# Patient Record
Sex: MALE | Race: WHITE | Employment: OTHER | URBAN - NONMETROPOLITAN AREA
[De-identification: names, ages, dates, MRNs, and addresses within clinical notes are randomized per-mention and may not be internally consistent; named-entity substitution may affect disease eponyms.]

---

## 2021-01-01 ENCOUNTER — HOSPITAL ENCOUNTER (OUTPATIENT)
Age: 71
Discharge: HOME | End: 2021-09-05
Payer: MEDICARE

## 2021-01-01 ENCOUNTER — TELEPHONE (OUTPATIENT)
Dept: PAIN MANAGEMENT | Age: 71
End: 2021-01-01

## 2021-01-01 ENCOUNTER — HOSPITAL ENCOUNTER (OUTPATIENT)
Dept: CT IMAGING | Age: 71
Discharge: HOME | End: 2021-10-29
Payer: MEDICARE

## 2021-01-01 ENCOUNTER — HOSPITAL ENCOUNTER (OUTPATIENT)
Age: 71
Setting detail: SPECIMEN
Discharge: HOME | End: 2021-06-16
Payer: MEDICARE

## 2021-01-01 ENCOUNTER — HOSPITAL ENCOUNTER (OUTPATIENT)
Dept: GENERAL RADIOLOGY | Age: 71
Discharge: HOME | End: 2021-09-05
Payer: MEDICARE

## 2021-01-01 ENCOUNTER — HOSPITAL ENCOUNTER (OUTPATIENT)
Dept: MRI IMAGING | Age: 71
End: 2021-01-01
Payer: MEDICARE

## 2021-01-01 ENCOUNTER — HOSPITAL ENCOUNTER (OUTPATIENT)
Age: 71
Setting detail: SPECIMEN
Discharge: HOME | End: 2021-07-16
Payer: MEDICARE

## 2021-01-01 ENCOUNTER — HOSPITAL ENCOUNTER (OUTPATIENT)
Dept: MRI IMAGING | Age: 71
Discharge: HOME | End: 2021-09-05
Payer: MEDICARE

## 2021-01-01 DIAGNOSIS — M54.12 CERVICAL NEURITIS: ICD-10-CM

## 2021-01-01 DIAGNOSIS — M25.512 BILATERAL SHOULDER PAIN, UNSPECIFIED CHRONICITY: ICD-10-CM

## 2021-01-01 DIAGNOSIS — M25.511 BILATERAL SHOULDER PAIN, UNSPECIFIED CHRONICITY: ICD-10-CM

## 2021-01-01 DIAGNOSIS — M54.16 LUMBAR RADICULOPATHY: ICD-10-CM

## 2021-01-01 DIAGNOSIS — M54.12 CERVICAL RADICULITIS: ICD-10-CM

## 2021-01-01 DIAGNOSIS — M54.16 LUMBAR NEURITIS: ICD-10-CM

## 2021-01-01 LAB
ABSOLUTE EOS #: 0.07 K/UL (ref 0–0.44)
ABSOLUTE IMMATURE GRANULOCYTE: <0.03 K/UL (ref 0–0.3)
ABSOLUTE LYMPH #: 1.26 K/UL (ref 1.1–3.7)
ABSOLUTE MONO #: 0.31 K/UL (ref 0.1–1.2)
ALBUMIN SERPL-MCNC: 3.8 G/DL (ref 3.5–5.2)
ALBUMIN/GLOBULIN RATIO: 1.4 (ref 1–2.5)
ALP BLD-CCNC: 106 U/L (ref 40–129)
ALT SERPL-CCNC: 10 U/L (ref 5–41)
ANION GAP SERPL CALCULATED.3IONS-SCNC: 13 MMOL/L (ref 9–17)
AST SERPL-CCNC: 20 U/L
BASOPHILS # BLD: 0 % (ref 0–2)
BASOPHILS ABSOLUTE: <0.03 K/UL (ref 0–0.2)
BILIRUB SERPL-MCNC: 0.41 MG/DL (ref 0.3–1.2)
BUN BLDV-MCNC: 12 MG/DL (ref 8–23)
BUN/CREAT BLD: ABNORMAL (ref 9–20)
CALCIUM SERPL-MCNC: 8.7 MG/DL (ref 8.6–10.4)
CHLORIDE BLD-SCNC: 101 MMOL/L (ref 98–107)
CO2: 24 MMOL/L (ref 20–31)
CREAT SERPL-MCNC: 0.44 MG/DL (ref 0.7–1.2)
DIFFERENTIAL TYPE: ABNORMAL
EOSINOPHILS RELATIVE PERCENT: 2 % (ref 1–4)
GFR AFRICAN AMERICAN: >60 ML/MIN
GFR NON-AFRICAN AMERICAN: >60 ML/MIN
GFR SERPL CREATININE-BSD FRML MDRD: ABNORMAL ML/MIN/{1.73_M2}
GFR SERPL CREATININE-BSD FRML MDRD: ABNORMAL ML/MIN/{1.73_M2}
GLUCOSE BLD-MCNC: 124 MG/DL (ref 70–99)
HCT VFR BLD CALC: 39.4 % (ref 40.7–50.3)
HEMOGLOBIN: 12.7 G/DL (ref 13–17)
IMMATURE GRANULOCYTES: 0 %
LYMPHOCYTES # BLD: 30 % (ref 24–43)
MAGNESIUM: 1.8 MG/DL (ref 1.6–2.6)
MCH RBC QN AUTO: 25.7 PG (ref 25.2–33.5)
MCHC RBC AUTO-ENTMCNC: 32.2 G/DL (ref 28.4–34.8)
MCV RBC AUTO: 79.6 FL (ref 82.6–102.9)
MONOCYTES # BLD: 7 % (ref 3–12)
NRBC AUTOMATED: 0 PER 100 WBC
PDW BLD-RTO: 13.9 % (ref 11.8–14.4)
PLATELET # BLD: 194 K/UL (ref 138–453)
PLATELET ESTIMATE: ABNORMAL
PMV BLD AUTO: 11.4 FL (ref 8.1–13.5)
POTASSIUM SERPL-SCNC: 3.4 MMOL/L (ref 3.7–5.3)
POTASSIUM SERPL-SCNC: 4.4 MMOL/L (ref 3.7–5.3)
RBC # BLD: 4.95 M/UL (ref 4.21–5.77)
RBC # BLD: ABNORMAL 10*6/UL
SEG NEUTROPHILS: 61 % (ref 36–65)
SEGMENTED NEUTROPHILS ABSOLUTE COUNT: 2.57 K/UL (ref 1.5–8.1)
SODIUM BLD-SCNC: 138 MMOL/L (ref 135–144)
THYROXINE, FREE: 3.4 NG/DL (ref 0.93–1.7)
TOTAL PROTEIN: 6.5 G/DL (ref 6.4–8.3)
TSH SERPL DL<=0.05 MIU/L-ACNC: <0.01 MIU/L (ref 0.3–5)
WBC # BLD: 4.2 K/UL (ref 3.5–11.3)
WBC # BLD: ABNORMAL 10*3/UL

## 2021-01-01 PROCEDURE — 72131 CT LUMBAR SPINE W/O DYE: CPT

## 2021-01-01 PROCEDURE — 73030 X-RAY EXAM OF SHOULDER: CPT

## 2021-01-01 PROCEDURE — 72050 X-RAY EXAM NECK SPINE 4/5VWS: CPT

## 2021-01-01 PROCEDURE — 72110 X-RAY EXAM L-2 SPINE 4/>VWS: CPT

## 2021-01-01 PROCEDURE — 72125 CT NECK SPINE W/O DYE: CPT

## 2021-06-08 NOTE — TELEPHONE ENCOUNTER
Initial Intake for Pain Management Clinic:  Kaushik Jason (48 y.o., male)    : 1950     Which Provider sent Referral Kylee Echavarria   What is your address (include city,state) [address]   Purpose for Referral    Will all medication management and therapy for pain be managed by Dr. Rehana Roldan?        Chief Complaint Lumbago with sciatica When did your pain start?    What is your pain level today?  (0-10)     8   How did you find out about us or who sent you? Kylee Echavarria   What do you feel caused your pain? Injured self at work years ago What words would you use to describe your pain? throbbing   What makes your pain better? Rest and medications What makes your pain worse?   moving   Have you had any MRI's or Xrays? If yes and they were from somewhere other than a [facility] ([facility]) facility - please bring copies of reports with you Yes moved here from [place], will bring Have you had any other tests done? **If yes and they were from somewhere other than a [facility] ([facility] facility - please bring copies of reports with you Over the years yes   Have you had any surgeries specific to your pain?   no Have you had any shots/injections for your pain? Back in the    Have you tried any therapies for your pain? If yes, what?  (I.e. PT, OT, Massage, Aqua, Chiropractic, TENS)   Back in the  What doctors have you seen for your pain and what did they do for you? Jessica echavarria referred to Dr Rehana Roldan   What medications have you tried? oxycodone Do you have any medication allergies? no   Do you drink alcohol? no Do you smoke? no   Any history of substance abuse?   no Are you employed? retired   What do you want to this treatment to do for you?  (I.E. Increase activity, return to work, decrease pain) Decrease the pan Please remind patient to bring ALL medications into clinic appointment.

## 2022-01-01 ENCOUNTER — APPOINTMENT (OUTPATIENT)
Dept: ULTRASOUND IMAGING | Age: 72
DRG: 871 | End: 2022-01-01
Payer: MEDICARE

## 2022-01-01 ENCOUNTER — APPOINTMENT (OUTPATIENT)
Dept: GENERAL RADIOLOGY | Age: 72
DRG: 871 | End: 2022-01-01
Payer: MEDICARE

## 2022-01-01 ENCOUNTER — APPOINTMENT (OUTPATIENT)
Dept: CT IMAGING | Age: 72
DRG: 871 | End: 2022-01-01
Payer: MEDICARE

## 2022-01-01 ENCOUNTER — HOSPITAL ENCOUNTER (OUTPATIENT)
Dept: NON INVASIVE DIAGNOSTICS | Age: 72
Discharge: HOME | DRG: 871 | End: 2022-04-15
Payer: MEDICARE

## 2022-01-01 ENCOUNTER — HOSPITAL ENCOUNTER (INPATIENT)
Age: 72
LOS: 1 days | DRG: 871 | End: 2022-04-16
Attending: EMERGENCY MEDICINE | Admitting: STUDENT IN AN ORGANIZED HEALTH CARE EDUCATION/TRAINING PROGRAM
Payer: MEDICARE

## 2022-01-01 VITALS
DIASTOLIC BLOOD PRESSURE: 56 MMHG | TEMPERATURE: 98.9 F | RESPIRATION RATE: 30 BRPM | SYSTOLIC BLOOD PRESSURE: 87 MMHG | OXYGEN SATURATION: 96 % | BODY MASS INDEX: 16.68 KG/M2 | HEART RATE: 162 BPM | HEIGHT: 74 IN | WEIGHT: 130 LBS

## 2022-01-01 DIAGNOSIS — E87.5 HYPERKALEMIA: ICD-10-CM

## 2022-01-01 DIAGNOSIS — N17.9 AKI (ACUTE KIDNEY INJURY) (HCC): ICD-10-CM

## 2022-01-01 DIAGNOSIS — R00.0 TACHYCARDIA: ICD-10-CM

## 2022-01-01 DIAGNOSIS — A41.9 SEPTICEMIA (HCC): Primary | ICD-10-CM

## 2022-01-01 LAB
-: ABNORMAL
ABSOLUTE BANDS #: 1.02 K/UL (ref 0–1)
ABSOLUTE EOS #: 0 K/UL (ref 0–0.44)
ABSOLUTE IMMATURE GRANULOCYTE: 0.26 K/UL (ref 0–0.3)
ABSOLUTE LYMPH #: 3.84 K/UL (ref 1.1–3.7)
ABSOLUTE MONO #: 0.77 K/UL (ref 0.1–1.2)
ANION GAP SERPL CALCULATED.3IONS-SCNC: 21 MMOL/L (ref 9–17)
BACTERIA: ABNORMAL
BANDS: 4 % (ref 0–10)
BASOPHILS # BLD: 0 % (ref 0–2)
BASOPHILS ABSOLUTE: 0 K/UL (ref 0–0.2)
BILIRUBIN URINE: NEGATIVE
BUN BLDV-MCNC: 100 MG/DL (ref 8–23)
BUN/CREAT BLD: 71 (ref 9–20)
CALCIUM SERPL-MCNC: 9.6 MG/DL (ref 8.6–10.4)
CASTS UA: ABNORMAL /LPF
CHLORIDE BLD-SCNC: 111 MMOL/L (ref 98–107)
CO2: 20 MMOL/L (ref 20–31)
COLOR: YELLOW
CREAT SERPL-MCNC: 1.4 MG/DL (ref 0.7–1.2)
CRYSTALS, UA: ABNORMAL /HPF
EKG ATRIAL RATE: 178 BPM
EKG ATRIAL RATE: 192 BPM
EKG Q-T INTERVAL: 214 MS
EKG Q-T INTERVAL: 256 MS
EKG QRS DURATION: 104 MS
EKG QRS DURATION: 108 MS
EKG QTC CALCULATION (BAZETT): 375 MS
EKG QTC CALCULATION (BAZETT): 398 MS
EKG R AXIS: 82 DEGREES
EKG R AXIS: 86 DEGREES
EKG T AXIS: -109 DEGREES
EKG T AXIS: -86 DEGREES
EKG VENTRICULAR RATE: 146 BPM
EKG VENTRICULAR RATE: 185 BPM
EOSINOPHILS RELATIVE PERCENT: 0 % (ref 1–4)
EPITHELIAL CELLS UA: ABNORMAL /HPF (ref 0–5)
GFR AFRICAN AMERICAN: >60 ML/MIN
GFR NON-AFRICAN AMERICAN: 50 ML/MIN
GFR SERPL CREATININE-BSD FRML MDRD: ABNORMAL ML/MIN/{1.73_M2}
GFR SERPL CREATININE-BSD FRML MDRD: ABNORMAL ML/MIN/{1.73_M2}
GLUCOSE BLD-MCNC: 294 MG/DL (ref 70–99)
GLUCOSE URINE: NEGATIVE
HCO3 ARTERIAL: 21.5 MMOL/L (ref 22–26)
HCT VFR BLD CALC: 33.8 % (ref 40.7–50.3)
HCT VFR BLD CALC: 39.2 % (ref 40.7–50.3)
HEMOGLOBIN: 10.1 G/DL (ref 13–17)
HEMOGLOBIN: 11.6 G/DL (ref 13–17)
IMMATURE GRANULOCYTES: 1 %
INR BLD: 1.7
KETONES, URINE: NEGATIVE
LACTIC ACID, SEPSIS: 4.6 MMOL/L (ref 0.5–1.9)
LACTIC ACID: 5.7 MMOL/L (ref 0.5–2.2)
LACTIC ACID: 9 MMOL/L (ref 0.5–2.2)
LEUKOCYTE ESTERASE, URINE: NEGATIVE
LV EF: 23 %
LVEF MODALITY: NORMAL
LYMPHOCYTES # BLD: 15 % (ref 24–43)
MCH RBC QN AUTO: 24.5 PG (ref 25.2–33.5)
MCH RBC QN AUTO: 24.5 PG (ref 25.2–33.5)
MCHC RBC AUTO-ENTMCNC: 29.6 G/DL (ref 28.4–34.8)
MCHC RBC AUTO-ENTMCNC: 29.9 G/DL (ref 28.4–34.8)
MCV RBC AUTO: 81.8 FL (ref 82.6–102.9)
MCV RBC AUTO: 82.9 FL (ref 82.6–102.9)
MONOCYTES # BLD: 3 % (ref 3–12)
MORPHOLOGY: ABNORMAL
MORPHOLOGY: ABNORMAL
MUCUS: ABNORMAL
NEGATIVE BASE EXCESS, ART: 1.1 MMOL/L (ref 0–2)
NITRITE, URINE: NEGATIVE
NRBC AUTOMATED: 0.1 PER 100 WBC
NRBC AUTOMATED: 0.1 PER 100 WBC
O2 DEVICE/FLOW/%: ABNORMAL
O2 SAT, ARTERIAL: 97.5 % (ref 95–98)
PARTIAL THROMBOPLASTIN TIME: 25.9 SEC (ref 26.8–34.8)
PATIENT TEMP: 37
PCO2 ARTERIAL: 30.3 MMHG (ref 35–45)
PDW BLD-RTO: 14.9 % (ref 11.8–14.4)
PDW BLD-RTO: 15.1 % (ref 11.8–14.4)
PH ARTERIAL: 7.47 (ref 7.35–7.45)
PH UA: 5.5 (ref 5–9)
PLATELET # BLD: 130 K/UL (ref 138–453)
PLATELET # BLD: 205 K/UL (ref 138–453)
PMV BLD AUTO: 11.2 FL (ref 8.1–13.5)
PMV BLD AUTO: 11.3 FL (ref 8.1–13.5)
PO2 ARTERIAL: 91.4 MMHG (ref 80–100)
POTASSIUM SERPL-SCNC: 5.4 MMOL/L (ref 3.7–5.3)
PROCALCITONIN: 0.4 NG/ML
PROTEIN UA: ABNORMAL
PROTHROMBIN TIME: 19.9 SEC (ref 11.5–14.2)
PT. POSITION: ABNORMAL
RBC # BLD: 4.13 M/UL (ref 4.21–5.77)
RBC # BLD: 4.73 M/UL (ref 4.21–5.77)
RBC UA: ABNORMAL /HPF (ref 0–2)
RESPIRATORY RATE: 26
SAMPLE SITE: ABNORMAL
SEG NEUTROPHILS: 77 % (ref 36–65)
SEGMENTED NEUTROPHILS ABSOLUTE COUNT: 19.71 K/UL (ref 1.5–8.1)
SODIUM BLD-SCNC: 152 MMOL/L (ref 135–144)
SPECIFIC GRAVITY UA: 1.02 (ref 1.01–1.02)
TOTAL CK: 132 U/L (ref 39–308)
TROPONIN, HIGH SENSITIVITY: 20 NG/L (ref 0–22)
TROPONIN, HIGH SENSITIVITY: 22 NG/L (ref 0–22)
TROPONIN, HIGH SENSITIVITY: 25 NG/L (ref 0–22)
TURBIDITY: CLEAR
URINE HGB: NEGATIVE
UROBILINOGEN, URINE: NORMAL
WBC # BLD: 18.6 K/UL (ref 3.5–11.3)
WBC # BLD: 25.6 K/UL (ref 3.5–11.3)
WBC UA: ABNORMAL /HPF (ref 0–5)

## 2022-01-01 PROCEDURE — 92960 CARDIOVERSION ELECTRIC EXT: CPT

## 2022-01-01 PROCEDURE — 73060 X-RAY EXAM OF HUMERUS: CPT

## 2022-01-01 PROCEDURE — 2580000003 HC RX 258: Performed by: NURSE PRACTITIONER

## 2022-01-01 PROCEDURE — 85027 COMPLETE CBC AUTOMATED: CPT

## 2022-01-01 PROCEDURE — 96376 TX/PRO/DX INJ SAME DRUG ADON: CPT

## 2022-01-01 PROCEDURE — 93010 ELECTROCARDIOGRAM REPORT: CPT | Performed by: INTERNAL MEDICINE

## 2022-01-01 PROCEDURE — 96375 TX/PRO/DX INJ NEW DRUG ADDON: CPT

## 2022-01-01 PROCEDURE — 6360000002 HC RX W HCPCS: Performed by: NURSE PRACTITIONER

## 2022-01-01 PROCEDURE — 6360000002 HC RX W HCPCS: Performed by: STUDENT IN AN ORGANIZED HEALTH CARE EDUCATION/TRAINING PROGRAM

## 2022-01-01 PROCEDURE — 6360000002 HC RX W HCPCS

## 2022-01-01 PROCEDURE — 71045 X-RAY EXAM CHEST 1 VIEW: CPT

## 2022-01-01 PROCEDURE — 93005 ELECTROCARDIOGRAM TRACING: CPT | Performed by: EMERGENCY MEDICINE

## 2022-01-01 PROCEDURE — 94761 N-INVAS EAR/PLS OXIMETRY MLT: CPT

## 2022-01-01 PROCEDURE — 2580000003 HC RX 258: Performed by: EMERGENCY MEDICINE

## 2022-01-01 PROCEDURE — 2500000003 HC RX 250 WO HCPCS

## 2022-01-01 PROCEDURE — 02HV33Z INSERTION OF INFUSION DEVICE INTO SUPERIOR VENA CAVA, PERCUTANEOUS APPROACH: ICD-10-PCS | Performed by: STUDENT IN AN ORGANIZED HEALTH CARE EDUCATION/TRAINING PROGRAM

## 2022-01-01 PROCEDURE — 70450 CT HEAD/BRAIN W/O DYE: CPT

## 2022-01-01 PROCEDURE — 6370000000 HC RX 637 (ALT 250 FOR IP): Performed by: NURSE PRACTITIONER

## 2022-01-01 PROCEDURE — 82550 ASSAY OF CK (CPK): CPT

## 2022-01-01 PROCEDURE — 2500000003 HC RX 250 WO HCPCS: Performed by: NURSE PRACTITIONER

## 2022-01-01 PROCEDURE — 82805 BLOOD GASES W/O2 SATURATION: CPT

## 2022-01-01 PROCEDURE — A4216 STERILE WATER/SALINE, 10 ML: HCPCS | Performed by: NURSE PRACTITIONER

## 2022-01-01 PROCEDURE — 99285 EMERGENCY DEPT VISIT HI MDM: CPT

## 2022-01-01 PROCEDURE — 84484 ASSAY OF TROPONIN QUANT: CPT

## 2022-01-01 PROCEDURE — 2500000003 HC RX 250 WO HCPCS: Performed by: EMERGENCY MEDICINE

## 2022-01-01 PROCEDURE — 85730 THROMBOPLASTIN TIME PARTIAL: CPT

## 2022-01-01 PROCEDURE — 73080 X-RAY EXAM OF ELBOW: CPT

## 2022-01-01 PROCEDURE — 85025 COMPLETE CBC W/AUTO DIFF WBC: CPT

## 2022-01-01 PROCEDURE — 85610 PROTHROMBIN TIME: CPT

## 2022-01-01 PROCEDURE — 1200000000 HC SEMI PRIVATE

## 2022-01-01 PROCEDURE — 36415 COLL VENOUS BLD VENIPUNCTURE: CPT

## 2022-01-01 PROCEDURE — 71250 CT THORAX DX C-: CPT

## 2022-01-01 PROCEDURE — 96374 THER/PROPH/DIAG INJ IV PUSH: CPT

## 2022-01-01 PROCEDURE — 83605 ASSAY OF LACTIC ACID: CPT

## 2022-01-01 PROCEDURE — 87040 BLOOD CULTURE FOR BACTERIA: CPT

## 2022-01-01 PROCEDURE — 6360000002 HC RX W HCPCS: Performed by: EMERGENCY MEDICINE

## 2022-01-01 PROCEDURE — 36600 WITHDRAWAL OF ARTERIAL BLOOD: CPT

## 2022-01-01 PROCEDURE — 99223 1ST HOSP IP/OBS HIGH 75: CPT | Performed by: INTERNAL MEDICINE

## 2022-01-01 PROCEDURE — 93306 TTE W/DOPPLER COMPLETE: CPT

## 2022-01-01 PROCEDURE — 81001 URINALYSIS AUTO W/SCOPE: CPT

## 2022-01-01 PROCEDURE — 84145 PROCALCITONIN (PCT): CPT

## 2022-01-01 PROCEDURE — 36569 INSJ PICC 5 YR+ W/O IMAGING: CPT

## 2022-01-01 PROCEDURE — 2700000000 HC OXYGEN THERAPY PER DAY

## 2022-01-01 PROCEDURE — 72125 CT NECK SPINE W/O DYE: CPT

## 2022-01-01 PROCEDURE — 80048 BASIC METABOLIC PNL TOTAL CA: CPT

## 2022-01-01 RX ORDER — DEXTROSE MONOHYDRATE 50 MG/ML
INJECTION, SOLUTION INTRAVENOUS
Status: DISCONTINUED
Start: 2022-01-01 | End: 2022-01-01

## 2022-01-01 RX ORDER — SODIUM CHLORIDE 9 MG/ML
INJECTION, SOLUTION INTRAVENOUS CONTINUOUS
Status: CANCELLED | OUTPATIENT
Start: 2022-01-01

## 2022-01-01 RX ORDER — 0.9 % SODIUM CHLORIDE 0.9 %
1000 INTRAVENOUS SOLUTION INTRAVENOUS ONCE
Status: COMPLETED | OUTPATIENT
Start: 2022-01-01 | End: 2022-01-01

## 2022-01-01 RX ORDER — MORPHINE SULFATE 2 MG/ML
2 INJECTION, SOLUTION INTRAMUSCULAR; INTRAVENOUS
Status: DISCONTINUED | OUTPATIENT
Start: 2022-01-01 | End: 2022-01-01 | Stop reason: HOSPADM

## 2022-01-01 RX ORDER — SODIUM CHLORIDE 0.9 % (FLUSH) 0.9 %
5-40 SYRINGE (ML) INJECTION PRN
Status: DISCONTINUED | OUTPATIENT
Start: 2022-01-01 | End: 2022-01-01

## 2022-01-01 RX ORDER — HEPARIN SODIUM 10000 [USP'U]/100ML
5-30 INJECTION, SOLUTION INTRAVENOUS CONTINUOUS
Status: DISCONTINUED | OUTPATIENT
Start: 2022-01-01 | End: 2022-01-01 | Stop reason: SDUPTHER

## 2022-01-01 RX ORDER — SODIUM POLYSTYRENE SULFONATE 15 G/60ML
15 SUSPENSION ORAL; RECTAL ONCE
Status: COMPLETED | OUTPATIENT
Start: 2022-01-01 | End: 2022-01-01

## 2022-01-01 RX ORDER — ACETAMINOPHEN 325 MG/1
650 TABLET ORAL EVERY 6 HOURS PRN
Status: DISCONTINUED | OUTPATIENT
Start: 2022-01-01 | End: 2022-01-01

## 2022-01-01 RX ORDER — ACETAMINOPHEN 650 MG/1
650 SUPPOSITORY RECTAL EVERY 6 HOURS PRN
Status: DISCONTINUED | OUTPATIENT
Start: 2022-01-01 | End: 2022-01-01 | Stop reason: HOSPADM

## 2022-01-01 RX ORDER — HEPARIN SODIUM 5000 [USP'U]/ML
5000 INJECTION, SOLUTION INTRAVENOUS; SUBCUTANEOUS EVERY 8 HOURS SCHEDULED
Status: DISCONTINUED | OUTPATIENT
Start: 2022-01-01 | End: 2022-01-01 | Stop reason: CLARIF

## 2022-01-01 RX ORDER — DIPHENHYDRAMINE HYDROCHLORIDE 50 MG/ML
25 INJECTION INTRAMUSCULAR; INTRAVENOUS ONCE
Status: COMPLETED | OUTPATIENT
Start: 2022-01-01 | End: 2022-01-01

## 2022-01-01 RX ORDER — HEPARIN SODIUM 10000 [USP'U]/100ML
5-30 INJECTION, SOLUTION INTRAVENOUS CONTINUOUS
Status: DISCONTINUED | OUTPATIENT
Start: 2022-01-01 | End: 2022-01-01

## 2022-01-01 RX ORDER — SODIUM CHLORIDE 9 MG/ML
25 INJECTION, SOLUTION INTRAVENOUS PRN
Status: DISCONTINUED | OUTPATIENT
Start: 2022-01-01 | End: 2022-01-01

## 2022-01-01 RX ORDER — AMLODIPINE BESYLATE 10 MG/1
10 TABLET ORAL DAILY
Status: ON HOLD | COMMUNITY
End: 2022-01-01 | Stop reason: HOSPADM

## 2022-01-01 RX ORDER — LORAZEPAM 2 MG/ML
0.5 INJECTION INTRAMUSCULAR ONCE
Status: COMPLETED | OUTPATIENT
Start: 2022-01-01 | End: 2022-01-01

## 2022-01-01 RX ORDER — SODIUM CHLORIDE 0.9 % (FLUSH) 0.9 %
5-40 SYRINGE (ML) INJECTION EVERY 12 HOURS SCHEDULED
Status: DISCONTINUED | OUTPATIENT
Start: 2022-01-01 | End: 2022-01-01 | Stop reason: HOSPADM

## 2022-01-01 RX ORDER — DILTIAZEM HYDROCHLORIDE 5 MG/ML
10 INJECTION INTRAVENOUS ONCE
Status: DISCONTINUED | OUTPATIENT
Start: 2022-01-01 | End: 2022-01-01

## 2022-01-01 RX ORDER — SODIUM CHLORIDE 0.9 % (FLUSH) 0.9 %
5-40 SYRINGE (ML) INJECTION EVERY 12 HOURS SCHEDULED
Status: DISCONTINUED | OUTPATIENT
Start: 2022-01-01 | End: 2022-01-01

## 2022-01-01 RX ORDER — MEMANTINE HYDROCHLORIDE 10 MG/1
5 TABLET ORAL 2 TIMES DAILY
Status: DISCONTINUED | OUTPATIENT
Start: 2022-01-01 | End: 2022-01-01

## 2022-01-01 RX ORDER — MEMANTINE HYDROCHLORIDE 5 MG/1
5 TABLET ORAL 2 TIMES DAILY
Status: ON HOLD | COMMUNITY
End: 2022-01-01 | Stop reason: HOSPADM

## 2022-01-01 RX ORDER — LIDOCAINE HYDROCHLORIDE 10 MG/ML
5 INJECTION, SOLUTION INFILTRATION; PERINEURAL ONCE
Status: DISCONTINUED | OUTPATIENT
Start: 2022-01-01 | End: 2022-01-01

## 2022-01-01 RX ORDER — HEPARIN SODIUM 1000 [USP'U]/ML
60 INJECTION, SOLUTION INTRAVENOUS; SUBCUTANEOUS PRN
Status: DISCONTINUED | OUTPATIENT
Start: 2022-01-01 | End: 2022-01-01

## 2022-01-01 RX ORDER — LORAZEPAM 2 MG/ML
1 INJECTION INTRAMUSCULAR ONCE
Status: COMPLETED | OUTPATIENT
Start: 2022-01-01 | End: 2022-01-01

## 2022-01-01 RX ORDER — FAMOTIDINE 20 MG/1
20 TABLET, FILM COATED ORAL 2 TIMES DAILY
Status: DISCONTINUED | OUTPATIENT
Start: 2022-01-01 | End: 2022-01-01

## 2022-01-01 RX ORDER — 0.9 % SODIUM CHLORIDE 0.9 %
1000 INTRAVENOUS SOLUTION INTRAVENOUS ONCE
Status: DISCONTINUED | OUTPATIENT
Start: 2022-01-01 | End: 2022-01-01

## 2022-01-01 RX ORDER — DONEPEZIL HYDROCHLORIDE 5 MG/1
5 TABLET, FILM COATED ORAL NIGHTLY
Status: DISCONTINUED | OUTPATIENT
Start: 2022-01-01 | End: 2022-01-01

## 2022-01-01 RX ORDER — LORAZEPAM 2 MG/ML
1 INJECTION INTRAMUSCULAR
Status: DISCONTINUED | OUTPATIENT
Start: 2022-01-01 | End: 2022-01-01 | Stop reason: HOSPADM

## 2022-01-01 RX ORDER — METOPROLOL TARTRATE 5 MG/5ML
5 INJECTION INTRAVENOUS ONCE
Status: COMPLETED | OUTPATIENT
Start: 2022-01-01 | End: 2022-01-01

## 2022-01-01 RX ORDER — SODIUM CHLORIDE, SODIUM LACTATE, POTASSIUM CHLORIDE, CALCIUM CHLORIDE 600; 310; 30; 20 MG/100ML; MG/100ML; MG/100ML; MG/100ML
30 INJECTION, SOLUTION INTRAVENOUS ONCE
Status: COMPLETED | OUTPATIENT
Start: 2022-01-01 | End: 2022-01-01

## 2022-01-01 RX ORDER — SODIUM CHLORIDE, SODIUM LACTATE, POTASSIUM CHLORIDE, CALCIUM CHLORIDE 600; 310; 30; 20 MG/100ML; MG/100ML; MG/100ML; MG/100ML
INJECTION, SOLUTION INTRAVENOUS CONTINUOUS
Status: DISCONTINUED | OUTPATIENT
Start: 2022-01-01 | End: 2022-01-01

## 2022-01-01 RX ORDER — DONEPEZIL HYDROCHLORIDE 5 MG/1
5 TABLET, FILM COATED ORAL NIGHTLY
Status: ON HOLD | COMMUNITY
End: 2022-01-01 | Stop reason: HOSPADM

## 2022-01-01 RX ORDER — ADENOSINE 3 MG/ML
6 INJECTION, SOLUTION INTRAVENOUS ONCE
Status: COMPLETED | OUTPATIENT
Start: 2022-01-01 | End: 2022-01-01

## 2022-01-01 RX ORDER — DILTIAZEM HYDROCHLORIDE 5 MG/ML
INJECTION INTRAVENOUS
Status: COMPLETED
Start: 2022-01-01 | End: 2022-01-01

## 2022-01-01 RX ORDER — SODIUM CHLORIDE 450 MG/100ML
INJECTION, SOLUTION INTRAVENOUS CONTINUOUS
Status: DISCONTINUED | OUTPATIENT
Start: 2022-01-01 | End: 2022-01-01

## 2022-01-01 RX ORDER — LORAZEPAM 2 MG/ML
INJECTION INTRAMUSCULAR
Status: COMPLETED
Start: 2022-01-01 | End: 2022-01-01

## 2022-01-01 RX ORDER — HEPARIN SODIUM 1000 [USP'U]/ML
60 INJECTION, SOLUTION INTRAVENOUS; SUBCUTANEOUS ONCE
Status: DISCONTINUED | OUTPATIENT
Start: 2022-01-01 | End: 2022-01-01

## 2022-01-01 RX ORDER — NOREPINEPHRINE BIT/0.9 % NACL 16MG/250ML
2-100 INFUSION BOTTLE (ML) INTRAVENOUS CONTINUOUS
Status: DISCONTINUED | OUTPATIENT
Start: 2022-01-01 | End: 2022-01-01

## 2022-01-01 RX ORDER — DILTIAZEM HYDROCHLORIDE 5 MG/ML
INJECTION INTRAVENOUS
Status: DISCONTINUED
Start: 2022-01-01 | End: 2022-01-01

## 2022-01-01 RX ORDER — SODIUM POLYSTYRENE SULFONATE 15 G/60ML
15 SUSPENSION ORAL; RECTAL ONCE
Status: DISCONTINUED | OUTPATIENT
Start: 2022-01-01 | End: 2022-01-01

## 2022-01-01 RX ORDER — HEPARIN SODIUM 1000 [USP'U]/ML
30 INJECTION, SOLUTION INTRAVENOUS; SUBCUTANEOUS PRN
Status: DISCONTINUED | OUTPATIENT
Start: 2022-01-01 | End: 2022-01-01

## 2022-01-01 RX ORDER — LORAZEPAM 2 MG/ML
1 INJECTION INTRAMUSCULAR EVERY 6 HOURS PRN
Status: DISCONTINUED | OUTPATIENT
Start: 2022-01-01 | End: 2022-01-01

## 2022-01-01 RX ORDER — HALOPERIDOL 5 MG/ML
5 INJECTION INTRAMUSCULAR ONCE
Status: COMPLETED | OUTPATIENT
Start: 2022-01-01 | End: 2022-01-01

## 2022-01-01 RX ADMIN — METOPROLOL TARTRATE 5 MG: 1 INJECTION, SOLUTION INTRAVENOUS at 04:39

## 2022-01-01 RX ADMIN — SODIUM CHLORIDE, POTASSIUM CHLORIDE, SODIUM LACTATE AND CALCIUM CHLORIDE 1770 ML: 600; 310; 30; 20 INJECTION, SOLUTION INTRAVENOUS at 07:38

## 2022-01-01 RX ADMIN — SODIUM CHLORIDE: 4.5 INJECTION, SOLUTION INTRAVENOUS at 11:46

## 2022-01-01 RX ADMIN — SODIUM POLYSTYRENE SULFONATE 15 G: 15 SUSPENSION ORAL; RECTAL at 09:08

## 2022-01-01 RX ADMIN — SODIUM CHLORIDE 1000 ML: 9 INJECTION, SOLUTION INTRAVENOUS at 04:48

## 2022-01-01 RX ADMIN — VANCOMYCIN HYDROCHLORIDE 1000 MG: 1 INJECTION, POWDER, LYOPHILIZED, FOR SOLUTION INTRAVENOUS at 04:59

## 2022-01-01 RX ADMIN — AMIODARONE HYDROCHLORIDE 150 MG: 50 INJECTION, SOLUTION INTRAVENOUS at 07:50

## 2022-01-01 RX ADMIN — LORAZEPAM 1 MG: 2 INJECTION INTRAMUSCULAR; INTRAVENOUS at 05:35

## 2022-01-01 RX ADMIN — DIPHENHYDRAMINE HYDROCHLORIDE 25 MG: 50 INJECTION, SOLUTION INTRAMUSCULAR; INTRAVENOUS at 03:59

## 2022-01-01 RX ADMIN — Medication 1 MG/MIN: at 08:24

## 2022-01-01 RX ADMIN — DILTIAZEM HYDROCHLORIDE 10 MG: 5 INJECTION INTRAVENOUS at 03:38

## 2022-01-01 RX ADMIN — MORPHINE SULFATE 2 MG: 2 INJECTION, SOLUTION INTRAMUSCULAR; INTRAVENOUS at 16:22

## 2022-01-01 RX ADMIN — MORPHINE SULFATE 2 MG: 2 INJECTION, SOLUTION INTRAMUSCULAR; INTRAVENOUS at 10:38

## 2022-01-01 RX ADMIN — SODIUM CHLORIDE, PRESERVATIVE FREE 10 ML: 5 INJECTION INTRAVENOUS at 07:50

## 2022-01-01 RX ADMIN — MORPHINE SULFATE 2 MG: 2 INJECTION, SOLUTION INTRAMUSCULAR; INTRAVENOUS at 21:22

## 2022-01-01 RX ADMIN — ADENOSINE 6 MG: 3 INJECTION, SOLUTION INTRAVENOUS at 03:53

## 2022-01-01 RX ADMIN — LORAZEPAM 1 MG: 2 INJECTION INTRAMUSCULAR; INTRAVENOUS at 23:03

## 2022-01-01 RX ADMIN — DILTIAZEM HYDROCHLORIDE 5 MG/HR: 5 INJECTION INTRAVENOUS at 10:55

## 2022-01-01 RX ADMIN — SODIUM CHLORIDE 1000 ML: 9 INJECTION, SOLUTION INTRAVENOUS at 04:06

## 2022-01-01 RX ADMIN — LORAZEPAM 1 MG: 2 INJECTION INTRAMUSCULAR; INTRAVENOUS at 05:39

## 2022-01-01 RX ADMIN — PIPERACILLIN AND TAZOBACTAM 3375 MG: 3; .375 INJECTION, POWDER, FOR SOLUTION INTRAVENOUS at 04:50

## 2022-01-01 RX ADMIN — LORAZEPAM 0.5 MG: 2 INJECTION INTRAMUSCULAR; INTRAVENOUS at 09:53

## 2022-01-01 RX ADMIN — DILTIAZEM HYDROCHLORIDE 5 MG/HR: 5 INJECTION INTRAVENOUS at 04:05

## 2022-01-01 RX ADMIN — LORAZEPAM 1 MG: 2 INJECTION INTRAMUSCULAR; INTRAVENOUS at 04:27

## 2022-01-01 RX ADMIN — CEFEPIME HYDROCHLORIDE 2000 MG: 2 INJECTION, POWDER, FOR SOLUTION INTRAVENOUS at 08:58

## 2022-01-01 RX ADMIN — LORAZEPAM 1 MG: 2 INJECTION INTRAMUSCULAR; INTRAVENOUS at 09:11

## 2022-01-01 RX ADMIN — SODIUM CHLORIDE, POTASSIUM CHLORIDE, SODIUM LACTATE AND CALCIUM CHLORIDE: 600; 310; 30; 20 INJECTION, SOLUTION INTRAVENOUS at 09:18

## 2022-01-01 RX ADMIN — SODIUM CHLORIDE, PRESERVATIVE FREE 10 ML: 5 INJECTION INTRAVENOUS at 21:15

## 2022-01-01 RX ADMIN — LORAZEPAM 1 MG: 2 INJECTION INTRAMUSCULAR; INTRAVENOUS at 11:37

## 2022-01-01 RX ADMIN — LORAZEPAM 1 MG: 2 INJECTION INTRAMUSCULAR; INTRAVENOUS at 13:58

## 2022-01-01 RX ADMIN — LORAZEPAM 1 MG: 2 INJECTION INTRAMUSCULAR; INTRAVENOUS at 05:06

## 2022-01-01 RX ADMIN — SODIUM CHLORIDE, PRESERVATIVE FREE 10 ML: 5 INJECTION INTRAVENOUS at 09:12

## 2022-01-01 RX ADMIN — HALOPERIDOL LACTATE 5 MG: 5 INJECTION, SOLUTION INTRAMUSCULAR at 04:00

## 2022-01-01 RX ADMIN — LORAZEPAM 0.5 MG: 2 INJECTION INTRAMUSCULAR at 09:53

## 2022-01-01 RX ADMIN — FAMOTIDINE 20 MG: 10 INJECTION INTRAVENOUS at 10:39

## 2022-01-01 RX ADMIN — MORPHINE SULFATE 2 MG: 2 INJECTION, SOLUTION INTRAMUSCULAR; INTRAVENOUS at 15:05

## 2022-01-01 ASSESSMENT — PAIN SCALES - GENERAL
PAINLEVEL_OUTOF10: 0
PAINLEVEL_OUTOF10: 0
PAINLEVEL_OUTOF10: 4
PAINLEVEL_OUTOF10: 4
PAINLEVEL_OUTOF10: 0

## 2022-04-15 PROBLEM — A41.9 SEVERE SEPSIS (HCC): Status: ACTIVE | Noted: 2022-01-01

## 2022-04-15 PROBLEM — F03.C0 SEVERE DEMENTIA: Status: ACTIVE | Noted: 2022-01-01

## 2022-04-15 PROBLEM — R64 CACHEXIA (HCC): Status: ACTIVE | Noted: 2022-01-01

## 2022-04-15 PROBLEM — I48.91 ATRIAL FIBRILLATION WITH RVR (HCC): Status: ACTIVE | Noted: 2022-01-01

## 2022-04-15 PROBLEM — N17.9 AKI (ACUTE KIDNEY INJURY) (HCC): Status: ACTIVE | Noted: 2022-01-01

## 2022-04-15 PROBLEM — E43 SEVERE MALNUTRITION (HCC): Status: ACTIVE | Noted: 2022-01-01

## 2022-04-15 PROBLEM — R65.20 SEVERE SEPSIS (HCC): Status: ACTIVE | Noted: 2022-01-01

## 2022-04-15 PROBLEM — E87.5 HYPERKALEMIA: Status: ACTIVE | Noted: 2022-01-01

## 2022-04-15 PROBLEM — J18.9 BILATERAL PNEUMONIA: Status: ACTIVE | Noted: 2022-01-01

## 2022-04-15 NOTE — CONSULTS
[facility]          [facility]        [address]        [place], [place] [id number]           Inpatient Initial consult note         4/15/2022 11:44 AM    Patient Name:   Concepcion Hyde  YOB: 1950  Primary Care Physician:  CECILLE Aguila NP     History Obtained From:  electronic medical record     Consultation requested by : Yudy Ochoa MD    requested for  : Evaluation of  worsening renal function, Acidosis and hyperkalemia     History of presentingillness   Concepcion Hyde is a 70 y.o.   male with Past Medical History as stated below presented with a chief complaint of Tachycardia (220 bpm ) and Shortness of Breath   on 4/15/2022 . Patient is not responsive and confused so accurate history and review of systems cannot be obtained    As per electronic medical record patient was brought to the emergency room by the family due to concerns of shortness of breath and an unwitnessed fall at home patient apparently has advanced dementia the son reported that he took his father to the restroom and heard him fall upon arrival of the EMS patient was tachycardic his heart rate was in the 200s EKG showed atrial fibrillation with RVR and he was in acute kidney injury with significant uremia. BUN of 100 and a creatinine of 1.4 his potassium was also elevated lactic acid was almost 9. Patient had received almost 4 L of fluid bolus and currently he is on lactated Ringer solution at 100 mL/h. Currently patient looks critically ill not responsive very cachectic not following any commands. Very cachectic.   Blood pressures have been running reasonable     Past History      Past Medical History:   Diagnosis Date    BRAXTON (acute kidney injury) ([address] 4/15/2022    Atrial fibrillation with RVR ([address] 4/15/2022    Bilateral pneumonia 4/15/2022    Cachexia ([address] 4/15/2022    Hyperkalemia 4/15/2022    Severe dementia ([address] 4/15/2022    Severe malnutrition ([address] 4/15/2022     No past surgical history on file. Social History     Socioeconomic History    Marital status:      Spouse name: Not on file    Number of children: Not on file    Years of education: Not on file    Highest education level: Not on file   Occupational History    Not on file   Tobacco Use    Smoking status: Not on file    Smokeless tobacco: Not on file   Substance and Sexual Activity    Alcohol use: Not on file    Drug use: Not on file    Sexual activity: Not on file   Other Topics Concern    Not on file   Social History Narrative    Not on file     Social Determinants of Health     Financial Resource Strain:     Difficulty of Paying Living Expenses: Not on file   Food Insecurity:     Worried About Running Out of Food in the Last Year: Not on file    Rekha of Food in the Last Year: Not on file   Transportation Needs:     Lack of Transportation (Medical): Not on file    Lack of Transportation (Non-Medical): Not on file   Physical Activity:     Days of Exercise per Week: Not on file    Minutes of Exercise per Session: Not on file   Stress:     Feeling of Stress : Not on file   Social Connections:     Frequency of Communication with Friends and Family: Not on file    Frequency of Social Gatherings with Friends and Family: Not on file    Attends Ep[employer] Services: Not on file    Active Member of [address] [place] [address] [employer] or Organizations: Not on file    Attends Club or Organization Meetings: Not on file    Marital Status: Not on file   Intimate Partner Violence:     Fear of Current or Ex-Partner: Not on file    Emotionally Abused: Not on file    Physically Abused: Not on file    Sexually Abused: Not on file   Housing Stability:     Unable to Pay for Housing in the Last Year: Not on file    Number of [place] in the Last Year: Not on file    Unstable Housing in the Last Year: Not on file     No family history on file.   Medications & Allergies      Prior to Admission medications    Medication Sig Start Date End Date Taking? Authorizing Provider   donepezil (ARICEPT) 5 MG tablet Take 5 mg by mouth nightly   Yes Historical Provider, MD   memantine (NAMENDA) 5 MG tablet Take 5 mg by mouth 2 times daily   Yes Historical Provider, MD   amLODIPine (NORVASC) 10 MG tablet Take 10 mg by mouth daily   Yes Historical Provider, MD     Allergies: Patient has no allergy information on record. IP meds : Scheduled Meds:   sodium chloride  1,000 mL IntraVENous Once    sodium chloride flush  5-40 mL IntraVENous 2 times per day    cefepime  2,000 mg IntraVENous Q12H    lidocaine 1 % injection  5 mL IntraDERmal Once    sodium chloride flush  5-40 mL IntraVENous 2 times per day    famotidine (PEPCID) injection  20 mg IntraVENous Daily    donepezil  5 mg Per NG tube Nightly    memantine  5 mg Per NG tube BID    heparin (porcine)  60 Units/kg IntraVENous Once     Continuous Infusions:   sodium chloride      sodium chloride      norepinephrine      dilTIAZem (CARDIZEM) 125 mg in dextrose 5% 125 mL infusion 7.5 mg/hr (04/15/22 1120)    heparin (PORCINE) Infusion      sodium chloride      sodium chloride       Review of Systems Physical Exam   Review of Systems   Unable to perform ROS: Mental status change    Physical Exam  Vitals reviewed. Constitutional:       General: He is not in acute distress. Appearance: He is ill-appearing. He is not diaphoretic. Comments: Very cachectic and malnourished   HENT:      Head: Normocephalic. Right Ear: External ear normal.      Left Ear: External ear normal.      Nose: Nose normal.      Mouth/Throat:      Mouth: Mucous membranes are dry. Comments: Extremely dry  Eyes:      General: No scleral icterus. Right eye: No discharge. Left eye: No discharge. Conjunctiva/sclera: Conjunctivae normal.   Neck:      Thyroid: No thyromegaly. Vascular: No JVD.    Cardiovascular:      Rate and Rhythm: Normal rate and regular rhythm. Heart sounds: Normal heart sounds. No murmur heard. Pulmonary:      Effort: Pulmonary effort is normal. No respiratory distress. Breath sounds: Normal breath sounds. No stridor. Chest:      Chest wall: No tenderness. Abdominal:      General: Bowel sounds are normal. There is no distension. Palpations: Abdomen is soft. Tenderness: There is no abdominal tenderness. Musculoskeletal:         General: No swelling or tenderness. Cervical back: Normal range of motion and neck supple. Right lower leg: No edema. Left lower leg: No edema. Skin:     General: Skin is warm and dry. Findings: No erythema or rash. Neurological:      General: No focal deficit present. Mental Status: He is oriented to person, place, and time. Psychiatric:         Mood and Affect: Mood normal.         Behavior: Behavior normal.           Vitals:    04/15/22 1115   BP: 83/65   Pulse: 161   Resp: 30   Temp:    SpO2: 99%     Labs, Radiology and Tests       Recent Labs     04/15/22  0340 04/15/22  1009   WBC 25.6* 18.6*   RBC 4.73 4.13*   HGB 11.6* 10.1*   HCT 39.2* 33.8*   MCV 82.9 81.8*   MCH 24.5* 24.5*   MCHC 29.6 29.9   RDW 15.1* 14.9*    130*     Recent Labs     04/15/22  0340   *   K 5.4*   *   CO2 20   *   CREATININE 1.40*   CALCIUM 9.6       Radiology : Chest x-ray reviewed by me does not appear to be significantly congested no overt consolidation noted    Other : All lab data have been reviewed and noted patient's creatinine was 1.4 in June 2021    Assessment    1 Renal -acute kidney injury, most likely secondary to severe volume depletion  ? Patient looks extremely dry. Aggressive IV hydration  ? Patient did receive almost 4 L of fluid bolus  ? At this time we will switch the fluids to half-normal saline at 100 mL/h and will also give 1 L of half-normal saline bolus  ? we will obtain a renal ultrasound scan.   Check a CPK level    2 Electrolytes -hypernatremia secondary to extreme volume depletion check some labs today and switch fluids to half-normal saline  3 Hyperkalemia due to BRAXTON should be better by now  4 Acid-base status patient has respiratory alkalosis and also lactic acidosis with metabolic acidosis. No acute need of bicarbonate infusion we will closely follow  5 Essential hypertension running stable closely monitor for any signs of hypotension  6 Change in mental status  7 Advanced dementia  8 Questionable sepsis may be from pneumonia on antibiotics  9 Meds reviewed and discussed with patient nurse and the hospitalist team      **This report has been created using voice recognition software. It maycontain minor  errors which are inherent in voice recognition technology. **    Minnie Wall MD,M.D  [facility].

## 2022-04-15 NOTE — PROGRESS NOTES
[facility]  Inpatient/Observation/Outpatient Rehabilitation    Date: 4/15/2022  Patient Name: Lisa Adame       [x] Inpatient Acute/Observation       []  Outpatient  : 1950       [] Pt no showed for scheduled appointment    [] Pt refused/declined therapy at this time due to:           [x] Pt cancelled due to:  [] No Reason Given   [] Sick/ill   [x] Other:Per RN, patient not appropriate for therapy. Therapist/Assistant will attempt to see this patient, at our earliest opportunity.        Shivani Dupont OTR/L Date: 4/15/2022

## 2022-04-15 NOTE — CONSULTS
[facility]                                                                 Clinical Pharmacy Consult              Chaz Luke is a 70 y.o. male whose chart and medications have been reviewed. Reason for medication review: atrial fibrillation with RVR    Requesting Physician: William Jane CNP    Recommendations:     1. Low dose heparin drip ordered and initiated.     Madonna Yost, PharmD, 4/15/2022 10:35 AM

## 2022-04-15 NOTE — PROGRESS NOTES
[facility] Patient is resting in bed with eyes open. No agitation or restlessness noted at this time.

## 2022-04-15 NOTE — PROGRESS NOTES
Comprehensive Nutrition Assessment    Type and Reason for Visit:  Initial    Nutrition Recommendations/Plan:  1. No recommendations at this time, hospice care is in process. Nutrition Assessment:  Severe malnutrition r/t inadequate protein-energy intake AEB BMI, moderate muscle loss, severe subcutaneous fat loss. Pt appeared severly malnourished, with significant weight loss. Unable to assess PO intake, limited weight history. Unable to talk to pt family before they left, and pt is going on hospice care. Clinical nutrition services will continue to be available as indicated or desired by patient or family. Follow-up and re-evaluate as needed. Malnutrition Assessment:  Malnutrition Status:  Severe malnutrition    Context:  Acute Illness     Findings of the 6 clinical characteristics of malnutrition:  Energy Intake:  Unable to assess  Weight Loss:  Unable to assess     Body Fat Loss:  7 - Moderate body fat loss Orbital,Triceps,Fat Overlying Ribs,Buccal region   Muscle Mass Loss:  7 - Moderate muscle mass loss Temples (temporalis),Clavicles (pectoralis & deltoids),Thigh (quadraceps),Calf (gastrocnemius),Hand (interosseous),Scapula (trapezius)  Fluid Accumulation:  No significant fluid accumulation     Strength:  Not Performed    Estimated Daily Nutrient Needs:  Energy (kcal):  2165-9432 (32-35); Weight Used for Energy Requirements:  Current     Protein (g):   (1.6-1.7); Weight Used for Protein Requirements:  Current        Fluid (ml/day):  2068; Method Used for Fluid Requirements:  1 ml/kcal      Nutrition Related Findings:  Severe malnutrition s/sx.       Wounds:  None       Current Nutrition Therapies:    No diet orders on file    Anthropometric Measures:  · Height: 6' 2\" (188 cm)  · Current Body Weight: 130 lb (59 kg)   · Admission Body Weight: 130 lb (59 kg)    · Usual Body Weight:       · Ideal Body Weight: 190 lbs; % Ideal Body Weight 68.4 %   · BMI: 16.7  · BMI Categories: Underweight (BMI less than 25) age over 72       Nutrition Diagnosis:   · Severe malnutrition related to inadequate protein-energy intake as evidenced by BMI,moderate muscle loss,severe loss of subcutaneous fat    Recent Labs     04/15/22  0340   *   K 5.4*   *   CO2 20   *   CREATININE 1.40*   GLUCOSE 294*   GFR                 Lab Results   Component Value Date    LABALBU 3.8 06/16/2021        Nutrition Interventions:   Food and/or Nutrient Delivery:  Continue Current Diet  Nutrition Education/Counseling:  Education not appropriate   Coordination of Nutrition Care:  Continue to monitor while inpatient    Goals:  Continue current diet, hospice care.        Nutrition Monitoring and Evaluation:   Behavioral-Environmental Outcomes:  None Identified   Food/Nutrient Intake Outcomes:  Food and Nutrient Intake  Physical Signs/Symptoms Outcomes:  Weight,Biochemical Data,Nutrition Focused Physical Findings     Discharge Planning:    Continue current diet     Electronically signed by Ruth Vickers on 4/15/22 at 12:56 PM EDT    Contact: [phone]

## 2022-04-15 NOTE — PROGRESS NOTES
DATE OF VISIT:  10/08/2017    SUBJECTIVE:  Clinical course noted.  The patient had dialysis catheter placed yesterday.  He received 1 unit of platelet transfusion, did not have any significant bleed.  The patient today voices no new complaints, continues to have hoarseness in the voice.    OBJECTIVE:    VITAL SIGNS:  He is afebrile, temperature 97.5, heart rate 77, respirations 20, blood pressure 122/57.     HEART:  Regular rate and rhythm.     LUNGS:  Clear to auscultation.     ABDOMEN:  Soft.     EXTREMITIES:  He has trace edema.    LABORATORY REVIEW:  Platelet counts were 25.    IMPRESSION AND PLAN:    1. This is a 72-year-old male with a stage IV head and neck cancer, received 1 dose of carboplatin and Taxol, currently chemotherapy is on hold.  2. Acute renal failure, may be secondary to acute tubular necrosis, had dialysis catheter placed and undergoing dialysis.  3. Thrombocytopenia, severe, likely multifactorial secondary to chemotherapy and marrow suppression.  Received a unit of platelets yesterday.  We will continue to monitor.  No evidence of active bleeding.  4. Atrial fibrillation with rapid ventricular response.  Currently, he is on metoprolol, Cardizem that has been weaned off.  He is not on anticoagulation secondary to thrombocytopenia.  5. Non-ST-segment elevation myocardial infarction.  6. G-tube site cellulitis, currently on antibiotic.        __________________________  Shahzad Cade MD  [id number]      D:  10/08/2017 14:22:34  T:  10/08/2017 14:33:05   /modl   Job:  [id number]          Elspeth Graciela, CNP updated on heparin drip not being able to start until PICC line is placed along with labs.

## 2022-04-15 NOTE — H&P
ICU Admission [facility] Medicine  History and Physical    Patient:  Clay Messina  MRN: [id number]    Chief Complaint: Family concern of shortness of breath    History Obtained From:  electronic medical record, reason patient could not give history:  altered mental status and nonverbal, Quality of history:  vague    PCP: CECILLE Patel NP    History of Present Illness: The patient is a 70 y.o. male who presented to the emergency room with family concern of shortness of breath and unwitnessed fall at home. Family reports patient has advanced dementia and is being cared for at home. The son reported he took his father to the restroom and heard him fall. EMS was called and reported patient was tachycardic. No other history was given. Upon arrival to the emergency room patient had heart rates in the 200s and was hypertensive and tachypneic. EKG showed atrial fibrillation with RVR. Patient was found to be in acute kidney injury with a BUN of 100 and creatinine of 1.40. Potassium was 5.4 and CO2 was 20. Patient's fasting glucose was 294. Troponin was elevated at 25 and decreased to 22. Lactic acid was 9 in the ER but not rechecked. Patient was given a 2 L fluid bolus at that time. Patient's white count was elevated to 25,000. Urinalysis was negative for UTI chest x-ray showed bilateral pneumonia. Patient was agitated while in the ER and was provided Haldol for agitation and the multiple benzodiazepines reported. Upon arrival to ICU patient is severely malnutrition and oral cavity is dry. Patient is nonverbal and does not follow direction. Patient continues to be tachycardic with heart rates in the 160s. Patient has severe cachexia. Patient has bruising to the left humerus and elbow with slight edema noted. No family is present.     Past Medical History:        Diagnosis Date    BRAXTON (acute kidney injury) ([facility] 4/15/2022    Atrial fibrillation with RVR ([facility] 4/15/2022    Bilateral pneumonia 4/15/2022    Cachexia ([facility] 4/15/2022    Hyperkalemia 4/15/2022    Severe dementia ([facility] 4/15/2022    Severe malnutrition ([facility] 4/15/2022       Past Surgical History:    No past surgical history on file. Medications Prior to Admission:    Prior to Admission medications    Medication Sig Start Date End Date Taking? Authorizing Provider   donepezil (ARICEPT) 5 MG tablet Take 5 mg by mouth nightly   Yes Historical Provider, MD   memantine (NAMENDA) 5 MG tablet Take 5 mg by mouth 2 times daily   Yes Historical Provider, MD   amLODIPine (NORVASC) 10 MG tablet Take 10 mg by mouth daily   Yes Historical Provider, MD       Allergies:  Patient has no allergy information on record. Social History:   TOBACCO:   has no history on file for tobacco use. ETOH:   has no history on file for alcohol use. Family History:   No family history on file. Allergies:  Patient has no allergy information on record. Medications Prior to Admission:    Prior to Admission medications    Medication Sig Start Date End Date Taking? Authorizing Provider   donepezil (ARICEPT) 5 MG tablet Take 5 mg by mouth nightly   Yes Historical Provider, MD   memantine (NAMENDA) 5 MG tablet Take 5 mg by mouth 2 times daily   Yes Historical Provider, MD   amLODIPine (NORVASC) 10 MG tablet Take 10 mg by mouth daily   Yes Historical Provider, MD       Review of Systems: Patient unable no family present      Physical Exam:    Vitals:    Temp: 97.3 °F (36.3 °C)  BP: (!) 189/155  Resp: 28  Pulse: 146  SpO2: 92 % on supplemental O2  SpO2 range:   SpO2  Av.2 %  Min: 84 %  Max: 97 %    Exam:  GEN:   Eyes open cachectic tremoring does not follow direction  EYES:  EOMI, pupils equal   MOUTH: Poor dentition multiple missing teeth very dry  NECK: Supple. No lymphadenopathy. No carotid bruit  CVS:    Irregular and tachycardic.   Atrial fibrillation with RVR heart rates up into the 150s and 160s, no audible murmur  PULM:  diminished but clear breath sounds without wheezing, rales or rhonchi, no acute respiratory distress   ABD:    Bowels sounds normal.  Abdomen is soft. No distention. no tenderness to palpation. EXT:   no edema bilaterally . No calf tenderness. Feet cool  NEURO: Moves all extremities. Motor and sensory are grossly intact  SKIN:  No rashes. No skin lesions. Abrasion to right knee.   Ecchymosis to left upper arm  -----------------------------------------------------------------  Diagnostic Data:     CBC:   Lab Results   Component Value Date    WBC 25.6 (H) 04/15/2022    RBC 4.73 04/15/2022    HGB 11.6 (L) 04/15/2022    HCT 39.2 (L) 04/15/2022    MCV 82.9 04/15/2022     04/15/2022      Lab Results   Component Value Date    SEGS 77 (H) 04/15/2022    NEUTROABS 19.71 (H) 04/15/2022    LYMPHOPCT 15 (L) 04/15/2022    LYMPHSABS 3.84 (H) 04/15/2022    MONOPCT 3 04/15/2022    EOSRELPCT 0 (L) 04/15/2022    BASOPCT 0 04/15/2022    IMMGRAN 1 (H) 04/15/2022     CMP:   Lab Results   Component Value Date    GLUCOSE 294 (H) 04/15/2022     (HH) 04/15/2022    CREATININE 1.40 (H) 04/15/2022     (H) 04/15/2022    K 5.4 (H) 04/15/2022    CALCIUM 9.6 04/15/2022     (H) 04/15/2022    CO2 20 04/15/2022    PROT 6.5 06/16/2021    LABALBU 3.8 06/16/2021    BILITOT 0.41 06/16/2021    ALKPHOS 106 06/16/2021    ALT 10 06/16/2021    AST 20 06/16/2021     UA:   Lab Results   Component Value Date    COLORU Yellow 04/15/2022    SPECGRAV 1.025 (H) 04/15/2022    WBCUA None 04/15/2022    RBCUA None 04/15/2022    EPITHUA 0 TO 2 04/15/2022    LEUKOCYTESUR NEGATIVE 04/15/2022    GLUCOSEU NEGATIVE 04/15/2022    KETUA NEGATIVE 04/15/2022    PROTEINU 1+ (A) 04/15/2022    HGBUR NEGATIVE 04/15/2022    CASTUA 2 TO 5 HYALINE 04/15/2022    CRYSTUA 0 TO 2 CALCIUM OXALATE (A) 04/15/2022    BACTERIA 1+ (A) 04/15/2022     Lactic Acid:   Lab Results   Component Value Date    LACTA 9.0 (H) 04/15/2022     D-Dimer:  No results found for: DDIMER  PT/INR:  Lab Results Component Value Date    PROTIME 19.9 04/15/2022    INR 1.7 04/15/2022     Troponin:  No results for input(s): TROPONINI in the last 72 hours. ABGs:   Lab Results   Component Value Date    PHART 7.468 04/15/2022    FZQ4BXI 30.3 04/15/2022    PO2ART 91.4 04/15/2022    YDW3MKX 21.5 04/15/2022    H3GJEEXH 97.5 04/15/2022         EKG reviewed        Imaging Data:  CT CHEST WO CONTRAST   Final Result   Bilateral pneumonia. CT Cervical Spine WO Contrast   Final Result   No acute intracranial abnormality is identified. No acute cervical spine fracture is identified. Generalized age-appropriate cortical atrophy, with chronic microvascular   ischemic change. Degenerative changes are seen in the cervical spine. CT Head WO Contrast   Final Result   No acute intracranial abnormality is identified. No acute cervical spine fracture is identified. Generalized age-appropriate cortical atrophy, with chronic microvascular   ischemic change. Degenerative changes are seen in the cervical spine. Assessment:    Principal Problem:    Severe sepsis (HCC)  Active Problems:    Bilateral pneumonia    Atrial fibrillation with RVR (HCC)    BRAXTON (acute kidney injury) ([address]    Hyperkalemia    Severe malnutrition (HCC)    Cachexia (HCC)    Severe dementia (HCC)  Resolved Problems:    * No resolved hospital problems.  *      Patient Active Problem List    Diagnosis Date Noted    Severe sepsis ([address] 04/15/2022    Bilateral pneumonia 04/15/2022    Atrial fibrillation with RVR ([address] 04/15/2022    BRAXTON (acute kidney injury) ([address] 04/15/2022    Hyperkalemia 04/15/2022    Severe malnutrition ([address] 04/15/2022    Cachexia ([address] 04/15/2022    Severe dementia ([address] 04/15/2022       Plan:     · This patient requires ICU admission because of severe sepsis without shock due to Bilateral pneumonia  · Estimated length of stay is 5 days  · Discussed patient's symptoms and data results including labs and imaging studies with the ER MD at time of admission  · Antibiotics: Cefepime   · Fluids: 30 mg/kg bolus initiated in ER due lactic acid >4 or hypotension   · Labs: monitor CBC with diff and CMP daily. Trend Lactic acid  · Cultures: blood cultures x 2 and urine culture ordered   · Imaging: all imaging studies reviewed   · Vasopressors: not indicated at this time   · Consults: Cardiology, nephrology   · IV fluids  · Hyperkalemia  · Kayexalate 15 g x 1  · BMP in 4 hours  · Bilateral pneumonia  · IV cefepime  · Cultures pending  · BRAXTON  · IV fluids  · Appreciate nephrology  · Severe dementia  · Hold Namenda, Aricept  · Severe malnutrition  · Appreciate dietitian  · Atrial fibrillation with RVR  · Received IV Lopressor and was started on IV Cardizem while in ER  · Stop Cardizem  · Start amiodarone with bolus of 150 mg.  · Echocardiogram  · Appreciate cardiology  · Fall  · CT head negative  · X-ray left elbow and humerus now  · DVT prophylaxis: Heparin  · Peptic ulcer prophylaxis: Pepcid  · High risk medications: Amiodarone drip  · Social Service and Case Management consults for DC planning  · Dietician consult initiated  · Disposition:  Discharge plan is Pending      CORE MEASURES  DVT prophylaxis: Heparin  Decubitus ulcer present on admission: No  CODE STATUS: FULL CODE  Nutrition Status: poor  Physical therapy: No   Old Charts reviewed: Yes  EKG Reviewed:  Yes  Advance Directive Addressed: Yes    CECILLE Whyte CNP, CECILLE, NP-C  4/15/2022, 7:43 AM

## 2022-04-15 NOTE — PROGRESS NOTES
Patient continues to be restless; new orders given per Sage Philippe.  See [facility]

## 2022-04-15 NOTE — PROGRESS NOTES
Pt had a incontinent bowel movement at this time; pericare provided and brief changed. Warm blankets also provided to the patient.

## 2022-04-15 NOTE — PROGRESS NOTES
Turner Crews CNP and Meredith, the  at bedside with family. Son's have decided make DNR-CC. Drips stopped and telemetry removed.

## 2022-04-15 NOTE — PROGRESS NOTES
I spoke with both the sons today regarding the patient's condition and current issues which include severe sepsis, acute renal failure, atrial fibrillation with RVR, severe dementia and severe malnutrition. I explained to him that he has been evaluated by cardiology as well as nephrology and despite efforts regarding his heart which included various medications and including cardioversion that the recommendation would be comfort care. All questions were answered and pastoral care was present. Both the sons were agreeable to withdraw treatment and treat him palliatively only with comfort meds. Orders were placed and patient will be transferred out of ICU.   CODE STATUS changed to DNR CC only    Hailee Ambrosio, APRN - CNP

## 2022-04-15 NOTE — PROGRESS NOTES
Patients son at the bedside and okay with morphine to be given for distress. Son verbalizes concerns regarding being able to pay for a  and what happens after his father passes.  has left for the day, sanjana was called to ask about options, there are minimal services available to help with funerals. Son was given that information, support was provided. Patient resting in bed and son denies any needs.

## 2022-04-15 NOTE — PROGRESS NOTES
Patient admitted to  from ER. Patient has advanced dementia and unable to answer questions or follow commands. Cardizem infusing. Patient -180's BPM. Patient on 3 liters oxygen, pulse oximetry 99%. Patient lives at home with son and during the night he had fallen, he has abrasions on BUE, BLE, and right eyelid. Patient very malnourished. Son told ER he has not been able to swallow or take pills for the last week or so and only usually drinks protein shakes since November. Lana Acuna CNP in department.

## 2022-04-15 NOTE — ED PROVIDER NOTES
[facility]  EMERGENCY DEPARTMENT ENCOUNTER      Pt Name: Paloma Quezada  MRN: [id number]  [place] 1950  Date of evaluation: 4/15/2022  Provider: Leandro Villagomez MD    [address] [place] [address]       Chief Complaint   Patient presents with    Tachycardia     220 bpm     Shortness of Breath         HISTORY OF PRESENT ILLNESS   (Location/Symptom, Timing/Onset, Context/Setting, Quality, Duration, Modifying Factors, Severity)  Note limiting factors. Paloma Quezada is a 70 y.o. male who presents to the emergency department with concern for shortness of breath and unwitnessed fall. Patient has advanced dementia persons and does not provide any history himself. Son states took father to restroom and heard him fall went in and saw father on the ground. Per EMS patient was tachycardic. No other history available at present time. HPI    Nursing Notes were reviewed. REVIEW OF SYSTEMS    (2-9 systems for level 4, 10 or more for level 5)     Review of Systems   Unable to perform ROS: Dementia       Except as noted above the remainder of the review of systems was reviewed and negative. PAST MEDICAL HISTORY   No past medical history on file. SURGICAL HISTORY     No past surgical history on file. CURRENT MEDICATIONS       Previous Medications    AMLODIPINE (NORVASC) 10 MG TABLET    Take 10 mg by mouth daily    DONEPEZIL (ARICEPT) 5 MG TABLET    Take 5 mg by mouth nightly    MEMANTINE (NAMENDA) 5 MG TABLET    Take 5 mg by mouth 2 times daily       ALLERGIES     Patient has no allergy information on record. FAMILY HISTORY     No family history on file.        SOCIAL HISTORY       Social History     Socioeconomic History    Marital status:      Spouse name: Not on file    Number of children: Not on file    Years of education: Not on file    Highest education level: Not on file   Occupational History    Not on file   Tobacco Use    Smoking status: Not on file    Smokeless tobacco: Not on file   Substance and Sexual Activity    Alcohol use: Not on file    Drug use: Not on file    Sexual activity: Not on file   Other Topics Concern    Not on file   Social History Narrative    Not on file     Social Determinants of Health     Financial Resource Strain:     Difficulty of Paying Living Expenses: Not on file   Food Insecurity:     Worried About Running Out of Food in the Last Year: Not on file    Rekha of Food in the Last Year: Not on file   Transportation Needs:     Lack of Transportation (Medical): Not on file    Lack of Transportation (Non-Medical): Not on file   Physical Activity:     Days of Exercise per Week: Not on file    Minutes of Exercise per Session: Not on file   Stress:     Feeling of Stress : Not on file   Social Connections:     Frequency of Communication with Friends and Family: Not on file    Frequency of Social Gatherings with Friends and Family: Not on file    Attends [employer] Services: Not on file    Active Member of [address] [place] [address] or Organizations: Not on file    Attends Club or Organization Meetings: Not on file    Marital Status: Not on file   Intimate Partner Violence:     Fear of Current or Ex-Partner: Not on file    Emotionally Abused: Not on file    Physically Abused: Not on file    Sexually Abused: Not on file   Housing Stability:     Unable to Pay for Housing in the Last Year: Not on file    Number of [place] in the Last Year: Not on file    Unstable Housing in the Last Year: Not on file       SCREENINGS         Winn Coma Scale  Eye Opening: To speech  Best Verbal Response:  Incomprehensible speech  Best Motor Response: Localizes pain  Jaylin Coma Scale Score: 10                     CIWA Assessment  BP: (!) 108/92  Pulse: 136                 PHYSICAL EXAM    (up to 7 for level 4, 8 or more for level 5)     ED Triage Vitals   BP Temp Temp src Pulse Resp SpO2 Height Weight   04/15/22 0326 -- -- 04/15/22 0326 04/15/22 0326 04/15/22 0341 -- 04/15/22 Head WO Contrast   Final Result   No acute intracranial abnormality is identified. No acute cervical spine fracture is identified. Generalized age-appropriate cortical atrophy, with chronic microvascular   ischemic change. Degenerative changes are seen in the cervical spine.          CT CHEST WO CONTRAST    (Results Pending)         ED BEDSIDE ULTRASOUND:   Performed by ED Physician - none    LABS:  Labs Reviewed   BASIC METABOLIC PANEL W/ REFLEX TO MG FOR LOW K - Abnormal; Notable for the following components:       Result Value    Glucose 294 (*)      (*)     CREATININE 1.40 (*)     Bun/Cre Ratio 71 (*)     Sodium 152 (*)     Potassium 5.4 (*)     Chloride 111 (*)     Anion Gap 21 (*)     GFR Non- 50 (*)     All other components within normal limits   CBC WITH AUTO DIFFERENTIAL - Abnormal; Notable for the following components:    WBC 25.6 (*)     Hemoglobin 11.6 (*)     Hematocrit 39.2 (*)     MCH 24.5 (*)     RDW 15.1 (*)     NRBC Automated 0.1 (*)     Seg Neutrophils 77 (*)     Lymphocytes 15 (*)     Eosinophils % 0 (*)     Immature Granulocytes 1 (*)     Segs Absolute 19.71 (*)     Absolute Lymph # 3.84 (*)     Absolute Bands # 1.02 (*)     All other components within normal limits   TROPONIN - Abnormal; Notable for the following components:    Troponin, High Sensitivity 25 (*)     All other components within normal limits   LACTIC ACID - Abnormal; Notable for the following components:    Lactic Acid 9.0 (*)     All other components within normal limits   PROTIME-INR - Abnormal; Notable for the following components:    Protime 19.9 (*)     All other components within normal limits   APTT - Abnormal; Notable for the following components:    PTT 25.9 (*)     All other components within normal limits   CULTURE, BLOOD 1   CULTURE, BLOOD 1   TROPONIN   URINALYSIS WITH REFLEX TO CULTURE       All other labs were within normal range or not returned as of this dictation. EMERGENCY DEPARTMENT COURSE and DIFFERENTIAL DIAGNOSIS/MDM:   Vitals:    Vitals:    04/15/22 0510 04/15/22 0539 04/15/22 0544 04/15/22 0634   BP:  (!) 159/92 (!) 108/92    Pulse:  143 156 136   Resp:  28 23 24   Temp:       TempSrc:       SpO2: (!) 89% (!) 87% 90% 95%   Weight:               MDM  Number of Diagnoses or Management Options  Diagnosis management comments: 77-year-old male presented with concern for fall and shortness of breath. Patient self did not provide any history but per EMS patient was tachycardic son bedside stated patient was in the restroom and heard him fall. Patient evidently has advanced dementia. Initial assessment patient was tachycardic narrow complex tachycardia blood pressure was stable but patient became hypotensive therefore we attempted to electrically cardiovert twice and subsequently tried calcium channel blocker,, AV chong blocker and then started him on a calcium channel blocker infusion. The heart rate did go below 200 bpm but was still not controlled therefore a beta-blocker was attempted, Lopressor 5 mg which did appear to work better than the calcium channel blocker. Patient remained agitated and was provided with Haldol originally for agitation and then multiple benzodiazepines. This delayed imaging patient's head neck and chest.  Patient's lab demonstrated leukocytosis elevated lactic acid elevated troponin. Amount and/or Complexity of Data Reviewed  Decide to obtain previous medical records or to obtain history from someone other than the patient: yes          [facility]   Total Critical Care time was  minutes, excluding separately reportable procedures. There was a high probability of clinically significant/life threatening deterioration in the patient's condition which required my urgent intervention.       CONSULTS:  None    PROCEDURES:  Unless otherwise noted below, none     Procedures        FINAL IMPRESSION 1. Septicemia ([facility]    2. Tachycardia    3. Hyperkalemia    4. BRAXTON (acute kidney injury) ([facility]          DISPOSITION/PLAN   DISPOSITION        PATIENT REFERRED TO:  No follow-up provider specified. DISCHARGE MEDICATIONS:  New Prescriptions    No medications on file     Controlled Substances Monitoring:     No flowsheet data found.     (Please note that portions of this note were completed with a voice recognition program.  Efforts were made to edit the dictations but occasionally words are mis-transcribed.)    Surekha Eric MD (electronically signed)  Attending Emergency Physician            Surekha Eric MD  04/16/22 [id number]

## 2022-04-15 NOTE — PROGRESS NOTES
Writer at bedside at this time to perform initial shift assessment. Patient is lying in bed awake. Vital signs taken and assessment completed at this time. Patient does not communicate any further needs at this time. Call light within reach, will continue to monitor.

## 2022-04-15 NOTE — PROGRESS NOTES
[facility]  Inpatient/Observation/Outpatient Rehabilitation    Date: 4/15/2022  Patient Name: Haydee Rosales       [x] Inpatient Acute/Observation       []  Outpatient  : 1950       [] Pt no showed for scheduled appointment    [] Pt refused/declined therapy at this time due to:           [x] Pt cancelled due to:  [] No Reason Given   [] Sick/ill   [x] Other: Per RN, patient is not appropriate at this time for therapy. Will check back with RN staff in PM.     Amanda Arango will attempt to see this patient, at our earliest opportunity.        Christina Ames, OTR/L Date: 4/15/2022

## 2022-04-16 NOTE — PROGRESS NOTES
Physician Progress Note      Jarred Whitfield  CSN #:                  [id number]  :                       1950  ADMIT DATE:       4/15/2022 3:23 AM  DISCH DATE:  RESPONDING  PROVIDER #:        Kaushik Mccoy MD          QUERY TEXT:    Pt admitted with severe sepsis. If possible, please document in the progress   notes and discharge summary if you are evaluating and/or treating any of the   following: The medical record reflects the following:  Risk Factors: Advanced age of 70, Septic  Clinical Indicators: Tachypneic with RR as high as 44, patient with severe   dementia, however son notes confusion, Wbc of 25.8, LA of 9.0, hypernatremic   and hyperkalemic likely due to severe volume depletion, patient with BRAXTON due   to severe volume depletion, hypotensive with BP as low as 82/52, feet cool to   touch  Treatment: IVF Bolus of 1000 mL NS x 4, IVF @ 100 mL/hr, IV Vanco, IV Zosyn,   IV Cefepime, IV Levophed, monitoring    Thank you,  Jazmyn Salinas RN  Options provided:  -- Septic Shock  -- Hypovolemic Shock  -- Hypovolemia without Shock  -- Hypotension without Shock  -- Other - I will add my own diagnosis  -- Disagree - Not applicable / Not valid  -- Disagree - Clinically unable to determine / Unknown  -- Refer to Clinical Documentation Reviewer    PROVIDER RESPONSE TEXT:    This patient is in hypovolemic shock. Query created by: Stephanie Marquez on 2022 7:22 AM      QUERY TEXT:    Pt admitted with sepsis, noted to have pneumonia. If possible, please document   in the progress notes and discharge summary if you are evaluating and/or   treating any of the following: The medical record reflects the following:  Risk Factors: Advanced age of 70, PNA  Clinical Indicators:  Tachypneic with RR as high as 44, SpO2 of 84% on RA -   placed on 3L NC, note of shallow, tachypneic, labored breathing with   diminished breath sounds  Treatment: supplemental o2, monitoring  Options provided:  -- Acute respiratory failure with hypoxia  -- Acute respiratory failure with hypercapnia  -- Acute respiratory failure with hypoxia and hypercapnia  -- Other - I will add my own diagnosis  -- Disagree - Not applicable / Not valid  -- Disagree - Clinically unable to determine / Unknown  -- Refer to Clinical Documentation Reviewer    PROVIDER RESPONSE TEXT:    This patient is in acute respiratory failure with hypoxia. Query created by:  Rosana Kurtz on 4/16/2022 7:27 AM      Electronically signed by:  Jesica Bermeo MD 4/16/2022 7:39 AM

## 2022-04-16 NOTE — PROGRESS NOTES
[address], [address]    Progress Note    Date:   4/16/2022  Patient name:  Clay Messina  Date of admission:  4/15/2022  3:23 AM  MRN:   [id number]  YOB: 1950    SUBJECTIVE/Last 24 hours update:     Patient seen and examined at the bed side , no new acute events overnight, no new concerns this morning. Family was present at the bedside this morning provide them with an update in the current plan and situation. All questions were answered. Notes from nursing staff and Consults had been reviewed, and the overnight progress had been checked with the nursing staff as well. REVIEW OF SYSTEMS:      I was unable to obtain the review of systems this morning as the patient is nonverbal does not provide any vocalizations or answers to any questions    PAST MEDICAL HISTORY:      has a past medical history of BRAXTON (acute kidney injury) ([address] Atrial fibrillation with RVR ([address] Bilateral pneumonia, Cachexia ([address] Hyperkalemia, Severe dementia ([address] and Severe malnutrition ([address] PAST SURGICAL HISTORY:     Reviewed and non-contributory or as noted above and/or in the HPI    SOCIAL HISTORY:        TOBACCO/ETOH: Reviewed and non-contributory or as noted above and/or in the HPI    FAMILY HISTORY:     Reviewed and non-contributory or as noted above and/or in the HPI    HOME MEDICATIONS:      Prior to Admission medications    Medication Sig Start Date End Date Taking? Authorizing Provider   donepezil (ARICEPT) 5 MG tablet Take 5 mg by mouth nightly   Yes Historical Provider, MD   memantine (NAMENDA) 5 MG tablet Take 5 mg by mouth 2 times daily   Yes Historical Provider, MD   amLODIPine (NORVASC) 10 MG tablet Take 10 mg by mouth daily   Yes Historical Provider, MD       ALLERGIES:     Patient has no allergy information on record.       OBJECTIVE:       Vitals:    04/15/22 1246 04/15/22 1300 04/15/22 1922 04/16/22 0738   BP:  (!) 126/99 136/68 (!) 87/56   Pulse: 148 132 162   Resp:  (!) 35 (!) 32 30   Temp:   97.3 °F (36.3 °C) 98.9 °F (37.2 °C)   TempSrc:   Temporal Temporal   SpO2:  100% 95% 96%   Weight:       Height: 6' 2\" (1.88 m)            Intake/Output Summary (Last 24 hours) at 4/16/2022 1342  Last data filed at 4/16/2022 0569  Gross per 24 hour   Intake --   Output 775 ml   Net -775 ml       PHYSICAL EXAM:  General Appearance  Alert , awake , in acute respiratory distress, Cachexia present  HEENT - Head is normocephalic, atraumatic. Poor dentition present  Lungs - Bilateral equal air entry , no wheezes, rales or rhonchi, aeration good  Cardiovascular - Heart sounds are regular, irregular irregular rhythm, normal rate without murmur, gallop or rub. Abdomen - Soft, nontender, nondistended, no masses or organomegaly  Neurologic - There are no new focal motor or sensory deficits  Skin - No bruising or bleeding on exposed skin area  Extremities - No cyanosis, clubbing or edema    DIAGNOSTICS:     Laboratory Testing:    No labs ordered per family preference    Current Facility-Administered Medications   Medication Dose Route Frequency Provider Last Rate Last Admin    LORazepam (ATIVAN) injection 1 mg  1 mg IntraVENous Q2H PRN Aiden Negrete MD   1 mg at 04/16/22 1137    acetaminophen (TYLENOL) suppository 650 mg  650 mg Rectal Q6H PRN Desi Ulrich APRN - CNP        sodium chloride flush 0.9 % injection 5-40 mL  5-40 mL IntraVENous 2 times per day Desi Ulrich APRN - CNP   10 mL at 04/16/22 0912    morphine (PF) injection 2 mg  2 mg IntraVENous Q2H PRN Desi Ulrich, APRN - CNP   2 mg at 04/16/22 1038       ASSESSMENT:     Principal Problem:    Severe sepsis (HCC)  Active Problems:    Bilateral pneumonia    Atrial fibrillation with RVR (HCC)    BRAXTON (acute kidney injury) ([facility]    Hyperkalemia    Severe malnutrition (HCC)    Cachexia (HCC)    Severe dementia ([facility]  Resolved Problems:    * No resolved hospital problems.  *      PLAN:     Sepsis, Bilateral

## 2022-04-16 NOTE — PROGRESS NOTES
Entered patient's room to see that patient's legs were hanging over the right side-rails. Repositioned patient. Tele sitter implemented at this time for patient safety. Patient is restless, PRN Ativan given at this time. Will continue to monitor.

## 2022-04-16 NOTE — PROGRESS NOTES
Writer was called by PCT stating family came out and said patient had passed. Writer and HAYDEN Bashir verified no pulse at this time. Supervisor called and she will be up when able.

## 2022-04-16 NOTE — PLAN OF CARE
Problem: Pain:  Goal: Pain level will decrease  Description: Pain level will decrease  4/16/2022 0031 by Sandra Horn RN  Outcome: Ongoing  Note: Pain assessed every four hours and as needed using 0-10 pain scale. Pt educated on scale and uses scale appropriately. Encourage pt to notify staff of pain before pain becomes uncontrollable. Correlate periods of heavy activity after pain medication administration. Use pharmacological and non pharmacological methods for pain relief such as: warm blankets, ice, television, reading, or rest.        Problem: Falls - Risk of:  Goal: Will remain free from falls  Description: Will remain free from falls  4/16/2022 0031 by Sandra Horn RN  Outcome: Ongoing  Note: Call light in reach at all times, frequent checks, bed in lowest position, wheels of bed and chair locked, non skid footwear on, appropriate transfer techniques, personal items within reach, walkways free of obstructions, fall risk armband and sign displayed, Ayon fall risk score per protocol. No falls this shift, will continue to monitor. Problem: Skin Integrity:  Goal: Will show no infection signs and symptoms  Description: Will show no infection signs and symptoms  4/16/2022 0031 by Sandra Horn RN  Outcome: Ongoing  Note: Monitor lab work. Will continue to monitor. Problem: SAFETY  Goal: Free from accidental physical injury  4/16/2022 0031 by Sandra Horn RN  Outcome: Ongoing  Note: Tele sitter on patient to ensure patient safety. Problem: DAILY CARE  Goal: Daily care needs are met  4/16/2022 0031 by Sandra Horn RN  Outcome: Ongoing  Note: Needs assessed hourly, pt alert and oriented able to express needs or meet needs independently. Problem: SKIN INTEGRITY  Goal: Skin integrity is maintained or improved  4/16/2022 0031 by Sandra Horn RN  Outcome: Ongoing  Note: Hernando scale monitoring per protocol. Inspect skin for breakdown frequently.  Encourage pt to make frequent large adjustments in position or assist patient with turning. Document all areas of breakdown. Problem: DISCHARGE BARRIERS  Goal: Patient's continuum of care needs are met  4/16/2022 0031 by Perla Tracy RN  Outcome: Ongoing  Note:  working with patient.

## 2022-04-16 NOTE — PROGRESS NOTES
Patient had a bowel movement at this time, keagan-care provided, brief changed, and linens changed. Patient also repositioned on his right side with pillow support. Patient is resting quietly in the bed, will continue to monitor.

## 2022-04-16 NOTE — PROGRESS NOTES
Shift assessment and vitals obtained at this time as charted. Patient is mostly unresponsive, is resting quietly in the bed. Will continue to monitor.

## 2022-04-16 NOTE — PROGRESS NOTES
Entered patient's room to see that patient's legs were hanging over the left side-rails. Repositioned patient. Will continue to monitor.

## 2022-04-17 NOTE — DISCHARGE SUMMARY
[address], [address]    Discharge Summary    NAME:  Mariano Newman  :  1950  MRN:  [id number]    Admit date:  4/15/2022  Discharge date:   22    Admitting Physician:  Hany Mccoy MD    Primary Diagnosis on Admission:   Present on Admission:   Severe sepsis ([address]   Bilateral pneumonia   Atrial fibrillation with RVR ([address]   BRAXTON (acute kidney injury) ([address]   Hyperkalemia   Severe malnutrition ([address]   Cachexia ([address]   Severe dementia ([address]    Secondary Diagnoses:  does not have any pertinent problems on file. Admission Condition:  Critical     Discharged Condition:     Hospital Course: The patient was admitted for the management of severe sepsis. He had presented to the ED at the time after having shortness of breath and an unwitnessed fall. He had advanced dementia. EMS had brought the patient for immediate evaluation. He had BRAXTON, Leukocytosis, with suspected infection 2/2 PNA. The patient had Afib w/ RVR. Cardiology and Nephrology were consulted. Prognosis was identified as guarded given the clinical presentation and a code status discussion was held with the patient's family at the bedside. The patient's code was changed from [facility] to [facility] and then subsequently to [facility]. He was transferred from the ICU and was provided with comfort care and palliative measures until his death was pronounced at approximately [address] on 2022. Attending physician that will sign Death Certificate: Dr. Hany Mccoy                                          Family notified: (Present in the room):                                [x]  Per Nursing     notified (Name): Divya Urban Farrah:                                                   [x]  Per Nursing    Consults:  Cardiology and Nephrology    Significant Diagnostic/theraputic interventions: IVF, IV Antibiotics    Disposition:    To the [facility]. 93. Copies to: CECLILE Cook - NP    Total time spent on discharge services: 35 minutes    Please note that this chart was generated using voice recognition Dragon dictation software. Although every effort was made to ensure the accuracy of this automated transcription, some errors in transcription may have occurred.     Fiorella Philip MD  4/16/2022 8:40 PM

## 2022-04-18 NOTE — PLAN OF CARE
Writer contacted [facility] per son, Leslie Spine request to release body to  home services. Chart reviewed with  home caretaker. Will dispatch  to  remains.

## 2022-04-20 LAB
CULTURE: NORMAL
Lab: NORMAL
SPECIMEN DESCRIPTION: NORMAL